# Patient Record
Sex: MALE | Race: WHITE | NOT HISPANIC OR LATINO | Employment: UNEMPLOYED | URBAN - METROPOLITAN AREA
[De-identification: names, ages, dates, MRNs, and addresses within clinical notes are randomized per-mention and may not be internally consistent; named-entity substitution may affect disease eponyms.]

---

## 2019-10-17 ENCOUNTER — TELEPHONE (OUTPATIENT)
Dept: FAMILY MEDICINE | Facility: CLINIC | Age: 59
End: 2019-10-17

## 2019-10-17 ENCOUNTER — OFFICE VISIT (OUTPATIENT)
Dept: FAMILY MEDICINE | Facility: CLINIC | Age: 59
End: 2019-10-17
Payer: COMMERCIAL

## 2019-10-17 VITALS
TEMPERATURE: 99 F | OXYGEN SATURATION: 97 % | HEIGHT: 74 IN | SYSTOLIC BLOOD PRESSURE: 143 MMHG | BODY MASS INDEX: 26.56 KG/M2 | WEIGHT: 207 LBS | HEART RATE: 56 BPM | RESPIRATION RATE: 15 BRPM | DIASTOLIC BLOOD PRESSURE: 90 MMHG

## 2019-10-17 DIAGNOSIS — F33.9 EPISODE OF RECURRENT MAJOR DEPRESSIVE DISORDER, UNSPECIFIED DEPRESSION EPISODE SEVERITY: ICD-10-CM

## 2019-10-17 DIAGNOSIS — F41.9 ANXIETY: ICD-10-CM

## 2019-10-17 DIAGNOSIS — N64.9 BREAST LESION: Primary | ICD-10-CM

## 2019-10-17 DIAGNOSIS — R06.02 SHORTNESS OF BREATH: ICD-10-CM

## 2019-10-17 DIAGNOSIS — F43.10 PTSD (POST-TRAUMATIC STRESS DISORDER): ICD-10-CM

## 2019-10-17 DIAGNOSIS — R13.10 DYSPHAGIA, UNSPECIFIED TYPE: ICD-10-CM

## 2019-10-17 DIAGNOSIS — N64.9 BREAST LESION: ICD-10-CM

## 2019-10-17 DIAGNOSIS — G47.9 SLEEP DISTURBANCE: Primary | ICD-10-CM

## 2019-10-17 PROCEDURE — 99203 OFFICE O/P NEW LOW 30 MIN: CPT | Mod: S$GLB,,, | Performed by: FAMILY MEDICINE

## 2019-10-17 PROCEDURE — 99203 PR OFFICE/OUTPT VISIT, NEW, LEVL III, 30-44 MIN: ICD-10-PCS | Mod: S$GLB,,, | Performed by: FAMILY MEDICINE

## 2019-10-17 RX ORDER — ALBUTEROL SULFATE 90 UG/1
2 AEROSOL, METERED RESPIRATORY (INHALATION) EVERY 6 HOURS PRN
Qty: 18 G | Refills: 11 | Status: SHIPPED | OUTPATIENT
Start: 2019-10-17 | End: 2019-11-18

## 2019-10-17 RX ORDER — TRAZODONE HYDROCHLORIDE 50 MG/1
50-100 TABLET ORAL NIGHTLY
Qty: 60 TABLET | Refills: 11 | Status: SHIPPED | OUTPATIENT
Start: 2019-10-17 | End: 2020-11-11

## 2019-10-17 NOTE — TELEPHONE ENCOUNTER
This is received for the referral you sent.    CPT code 98701 is not on  auth do you still want to use it or change it to one that is on the auth? If changing please change the referral and the order please to reflect the new code.     Thank you   Milton SCANLON   [id number]    Please advise?

## 2019-10-17 NOTE — LETTER
October 18, 2019      [facility]  [address] [facility] [address]           [facility] [address] [place] [address]  Phone: [phone]  Fax: [phone]          Patient: Leonard Rayo   MR Number: [id number]   YOB: 1960   Date of Visit: 10/17/2019       Dear [facility]:    Thank you for referring Leonard Rayo to me for evaluation. Attached you will find relevant portions of my assessment and plan of care.    If you have questions, please do not hesitate to call me. I look forward to following Leonard Rayo along with you.    Sincerely,    Yamilet Kelsey MD    Enclosure  CC:  No Recipients    If you would like to receive this communication electronically, please contact [email or web address] or [phone] to request more information on [employer] Link access.    For providers and/or their staff who would like to refer a patient to [facility], please contact us through our one-stop-shop provider referral line, [facility], at [phone].    If you feel you have received this communication in error or would no longer like to receive these types of communications, please e-mail [email or web address]

## 2019-10-17 NOTE — PROGRESS NOTES
[facility] Note    Subjective      Mr. Rayo is a 59 y.o. male who presents to clinic for establish care.     Is a [place]. [facility] care with us.   Degenerative Disc disease - went to Dr. Goetz and Dr. Anaya who said that  - was told that there was nothing they could do hash does occasionally smoke marijuana which helps relieve his pain.  Does not want any opiates for pain relief.    Shortness of breath  -has had this for several years.  Was previously on for inhalers at 1 time but was taken off of them because of insurance purposes.  Has is asbestos exposure but also history of tobacco use.    Diagnosis with hepatitis-C in 2016, was successfully treated     Dysphagia - Was having trouble swallowing pills.  Is not sure if he has had a scope or not.    2009 - injury - fell - back and knee pain    Sleep  - has tried a lot for sleep in the past, nothing has been really successful for him, he does not want to take any strong medicines for slight        PMH Leonard has a past medical history of Anxiety (2012) and Depression (2012).   PS Leonard has no past surgical history on file.    Leonard's family history is not on file.   SH Leonard reports that he has quit smoking. He has never used smokeless tobacco. He reports that he drinks about 2.0 standard drinks of alcohol per week. He reports that he has current or past drug history.   ALG Leonard has No Known Allergies.   MED Leonard has a current medication list which includes the following prescription(s): albuterol and trazodone.     Review of Systems   Constitutional: Negative.    HENT: Negative.    Eyes: Negative.    Respiratory: Positive for shortness of breath.    Cardiovascular: Positive for chest pain.   Gastrointestinal: Negative for nausea.        Dysphagia   Endocrine: Negative.    Genitourinary: Negative.    Musculoskeletal: Positive for arthralgias and back pain.   Skin: Negative for rash.  "  Psychiatric/Behavioral: Negative for sleep disturbance and suicidal ideas.    Review of systems otherwise negative  Objective     BP (!) 143/90 (BP Location: Right arm, Patient Position: Sitting, BP Method: Large (Automatic))   Pulse (!) 56   Temp 98.7 °F (37.1 °C) (Oral)   Resp 15   Ht 6' 2" (1.88 m)   Wt 93.9 kg (207 lb)   SpO2 97%   BMI 26.58 kg/m²     Physical Exam   Constitutional: He is well-developed, well-nourished, and in no distress. No distress.   HENT:   Head: Normocephalic and atraumatic.   Right Ear: External ear normal.   Left Ear: External ear normal.   Nose: Nose normal.   Mouth/Throat: Oropharynx is clear and moist.   Eyes: Conjunctivae are normal.   Cardiovascular: Normal rate, regular rhythm, normal heart sounds and intact distal pulses.   No murmur heard.  Pulmonary/Chest: He has no wheezes. He has no rales.   Approximately 1 cm nodule in the right breast at 11:00 position.  Mobile, firm.  No skin changes or nipple discharge. Left breast normal.  Lungs:  Moderate air movement, no wheezes or rales, shallow breathing with emphysematous changes noted   Abdominal: Soft. Bowel sounds are normal.   Musculoskeletal: He exhibits no edema or tenderness.   Lymphadenopathy:     He has no cervical adenopathy.   Skin:   Occasional lipomas   Psychiatric:   Tearful throughout the visit, sad, anxious, no SI/HI   Vitals reviewed.    Assessment/Plan     1. Sleep disturbance  traZODone (DESYREL) 50 MG tablet   2. Shortness of breath  albuterol (VENTOLIN HFA) 90 mcg/actuation inhaler   3. Breast lesion  Mammo Digital Diagnostic Bilat w/ Jose Raul    US Breast Right Limited   4. Dysphagia, unspecified type     5. Episode of recurrent major depressive disorder, unspecified depression episode severity     6. Anxiety     7. PTSD (post-traumatic stress disorder)         Will follow up in discuss depression, anxiety, PTSD.  For now start trazodone as patient is hesitant to start an SSRI.  Needs evaluation of " dysphagia.  Recommend trying over-the-counter aids until close follow up.    Follow up in about 1 month (around 11/17/2019).    Future Appointments   Date Time Provider Department Center   11/1/2019  9:20 AM [facility] MAMMO1 [facility] MAMMO [facility]   11/1/2019 11:30 AM [facility] US1 [facility] US [facility]   11/18/2019  9:00 AM Yamilet Kelsey MD [facility]       Yamilet Kelsey MD  Family Medicine  [facility]  [phone]

## 2019-10-18 NOTE — TELEPHONE ENCOUNTER
The Mammogram and US CTP codes need to be changed. The one you have on it now 56407 is not on patient's Insurance but 34988 is. (Breast Mammography) So, can you change on the orders for mammo and us? This was they can send to Insurance. Thank you

## 2019-10-18 NOTE — TELEPHONE ENCOUNTER
Will you ask her if I have to change the diagnosis code that's associated with the order or the actual order? If I have to change the order, I don't see one for just breast mammography.

## 2019-10-31 ENCOUNTER — TELEPHONE (OUTPATIENT)
Dept: FAMILY MEDICINE | Facility: CLINIC | Age: 59
End: 2019-10-31

## 2019-10-31 NOTE — TELEPHONE ENCOUNTER
Inform pt to call Mrs Tucker in scheduling at [phone] for more information on his mammogram. Pt states he will reschedule his appointment with imaging.        ----- Message from Sohan Ferguson sent at 10/31/2019  3:19 PM CDT -----  Contact: Patient  Type: Needs Medical Advice    Who Called:  Patient  Best Call Back Number: [phone]  Additional Information: Patient states mammogram requires the diagnosis code for the authorization. Please call to advise. Thanks!

## 2019-11-04 ENCOUNTER — PATIENT OUTREACH (OUTPATIENT)
Dept: ADMINISTRATIVE | Facility: HOSPITAL | Age: 59
End: 2019-11-04

## 2019-11-04 NOTE — LETTER
"November 4, 2019    Leonard Rayo  [address]  [place] [address]             [facility]  [address]  [facility] [address]  Phone: [phone] Dear Leonard    [facility] is committed to your overall health and would like to ensure that you are up to date on your recommended test and/or procedures.   Yamilet Kelsey MD  has found that your chart shows you may be due for the following:    Health Maintenance Due   Topic Date Due    Hepatitis C Screening  1960    Lipid Panel  1960    TETANUS VACCINE  09/17/1978    Shingles Vaccine (1 of 2) 09/17/2010    Colonoscopy  09/17/2010    Influenza Vaccine (1) 09/01/2019     If you haven't signed up for a colorectal screening please accept this invitation to be screened.      According to the American Cancer Society, colon cancer is the third most common cancer for people in the [place].  A simple screening test "Fit Kit" - done in your own home - can help find colon cancer at an early stage when it can be treated, even before any signs or symptoms develop. THIS IS A YEARLY TEST.    Testing for blood in your stool (feces or bowel movement) is the first step. If you have an upcoming visit with your Primary Care Physician you can  a Fit Kit during your visit or you can  a Fit Kit at your Primary Care Clinic prior to your visit.    The Fit Kit includes:    · Instructions on how to perform the test  · (1) Sheet of tissue paper  · (1) Small Absorption pad  · (1) Bottle to hold the sample and a small probe to help you take the sample  · (1) Small plastic bio-hazard bag  · (1) Postage-paid return envelope    Please do your test (the instructions will tell you how) and then return your sample in the postage-paid return envelope within 24 hours of collection.     If your test results are negative, you won't need testing again for another year.  If results show you need more testing, we will call you with next " "steps.    Regular colorectal cancer screening is one of the most powerful weapons for preventing colon cancer.  The website https://www.cancer.org/cancer/colon-rectal-cancer.html can answer many of your questions about this cancer and its prevention.  Just search for "colorectal cancer".  If you have any questions please call [facility] [phone] for assistance.  If you have had any of the above done at another facility, please let us know so that we may obtain copies from that facility.  If you have a copy of these records, please provide a copy for us to scan into your chart.  You are welcome to request that the report be faxed to us at  ([phone]).     Otherwise, please schedule these appointments at your earliest convenience by calling [phone] or going to [email or web address].    If you have an upcoming scheduled appointment for the item above, please disregard this letter.    Sincerely,  Your [facility]  MD Erika Jean L.P.N. Clinical Care Coordinator  [address] [place] [address], [place] [address] [phone]         "

## 2019-11-06 ENCOUNTER — TELEPHONE (OUTPATIENT)
Dept: FAMILY MEDICINE | Facility: CLINIC | Age: 59
End: 2019-11-06

## 2019-11-06 NOTE — TELEPHONE ENCOUNTER
Followed up with Milton León about patient's mammogram. She spoke with [place] Radiologist and he was reaching out to the patient to explain insurance may no cover procedure. Rad will let Milton León know, but has not heard back. I left a voice message for patient to call me back.

## 2019-11-11 ENCOUNTER — TELEPHONE (OUTPATIENT)
Dept: FAMILY MEDICINE | Facility: CLINIC | Age: 59
End: 2019-11-11

## 2019-11-11 NOTE — TELEPHONE ENCOUNTER
----- Message from Michelle Saab LPN sent at 11/8/2019  7:55 PM CST -----  Contact: Patient      ----- Message -----  From: Sohan Ferguson  Sent: 11/8/2019  11:59 AM CST  To: Low SCANLON Staff    Type:  Patient Returning Call    Who Called: Patient  Who Left Message for Patient:  Shavon Smyth  Does the patient know what this is regarding?:  Unknown - no note/message  Best Call Back Number:  [phone]  Additional Information:  NA

## 2019-11-18 ENCOUNTER — DOCUMENTATION ONLY (OUTPATIENT)
Dept: FAMILY MEDICINE | Facility: CLINIC | Age: 59
End: 2019-11-18

## 2019-11-18 ENCOUNTER — OFFICE VISIT (OUTPATIENT)
Dept: FAMILY MEDICINE | Facility: CLINIC | Age: 59
End: 2019-11-18
Payer: COMMERCIAL

## 2019-11-18 ENCOUNTER — TELEPHONE (OUTPATIENT)
Dept: FAMILY MEDICINE | Facility: CLINIC | Age: 59
End: 2019-11-18

## 2019-11-18 VITALS
DIASTOLIC BLOOD PRESSURE: 92 MMHG | TEMPERATURE: 98 F | BODY MASS INDEX: 26.85 KG/M2 | OXYGEN SATURATION: 95 % | HEIGHT: 74 IN | WEIGHT: 209.19 LBS | HEART RATE: 59 BPM | RESPIRATION RATE: 15 BRPM | SYSTOLIC BLOOD PRESSURE: 150 MMHG

## 2019-11-18 DIAGNOSIS — K04.7 TOOTH INFECTION: ICD-10-CM

## 2019-11-18 DIAGNOSIS — R09.81 NASAL CONGESTION: ICD-10-CM

## 2019-11-18 DIAGNOSIS — R06.02 SHORTNESS OF BREATH: ICD-10-CM

## 2019-11-18 DIAGNOSIS — R13.10 DYSPHAGIA, UNSPECIFIED TYPE: ICD-10-CM

## 2019-11-18 DIAGNOSIS — Z11.59 NEED FOR HEPATITIS C SCREENING TEST: Primary | ICD-10-CM

## 2019-11-18 DIAGNOSIS — R68.89 FLU-LIKE SYMPTOMS: ICD-10-CM

## 2019-11-18 DIAGNOSIS — Z13.220 LIPID SCREENING: ICD-10-CM

## 2019-11-18 DIAGNOSIS — F41.9 ANXIETY: ICD-10-CM

## 2019-11-18 DIAGNOSIS — Z12.11 COLON CANCER SCREENING: ICD-10-CM

## 2019-11-18 DIAGNOSIS — G47.9 SLEEP DISTURBANCE: ICD-10-CM

## 2019-11-18 LAB
INFLUENZA A, MOLECULAR: NEGATIVE
INFLUENZA B, MOLECULAR: NEGATIVE
SPECIMEN SOURCE: NORMAL

## 2019-11-18 PROCEDURE — 99214 PR OFFICE/OUTPT VISIT, EST, LEVL IV, 30-39 MIN: ICD-10-PCS | Mod: S$GLB,,, | Performed by: FAMILY MEDICINE

## 2019-11-18 PROCEDURE — 87502 INFLUENZA DNA AMP PROBE: CPT

## 2019-11-18 PROCEDURE — 99214 OFFICE O/P EST MOD 30 MIN: CPT | Mod: S$GLB,,, | Performed by: FAMILY MEDICINE

## 2019-11-18 RX ORDER — DULOXETIN HYDROCHLORIDE 30 MG/1
30 CAPSULE, DELAYED RELEASE ORAL DAILY
Qty: 30 CAPSULE | Refills: 11 | Status: SHIPPED | OUTPATIENT
Start: 2019-11-18 | End: 2019-12-18 | Stop reason: SDUPTHER

## 2019-11-18 RX ORDER — DULOXETIN HYDROCHLORIDE 30 MG/1
30 CAPSULE, DELAYED RELEASE ORAL DAILY
Qty: 30 CAPSULE | Refills: 11 | Status: CANCELLED | OUTPATIENT
Start: 2019-11-18 | End: 2020-11-17

## 2019-11-18 RX ORDER — PRAZOSIN HYDROCHLORIDE 1 MG/1
1 CAPSULE ORAL NIGHTLY
Qty: 30 CAPSULE | Refills: 11 | Status: CANCELLED | OUTPATIENT
Start: 2019-11-18 | End: 2020-11-17

## 2019-11-18 RX ORDER — CETIRIZINE HYDROCHLORIDE, PSEUDOEPHEDRINE HYDROCHLORIDE 5; 120 MG/1; MG/1
1 TABLET, FILM COATED, EXTENDED RELEASE ORAL 2 TIMES DAILY PRN
Qty: 20 TABLET | Refills: 0 | Status: CANCELLED | OUTPATIENT
Start: 2019-11-18 | End: 2019-11-28

## 2019-11-18 RX ORDER — CLINDAMYCIN HYDROCHLORIDE 300 MG/1
300 CAPSULE ORAL EVERY 8 HOURS
Qty: 21 CAPSULE | Refills: 0 | Status: SHIPPED | OUTPATIENT
Start: 2019-11-18 | End: 2020-11-11 | Stop reason: ALTCHOICE

## 2019-11-18 RX ORDER — PRAZOSIN HYDROCHLORIDE 1 MG/1
1 CAPSULE ORAL NIGHTLY
Qty: 30 CAPSULE | Refills: 11 | Status: SHIPPED | OUTPATIENT
Start: 2019-11-18 | End: 2020-11-11

## 2019-11-18 RX ORDER — ALBUTEROL SULFATE 90 UG/1
2 AEROSOL, METERED RESPIRATORY (INHALATION) EVERY 6 HOURS PRN
Qty: 18 G | Refills: 11 | Status: SHIPPED | OUTPATIENT
Start: 2019-11-18 | End: 2019-12-18 | Stop reason: SDUPTHER

## 2019-11-18 RX ORDER — CETIRIZINE HYDROCHLORIDE, PSEUDOEPHEDRINE HYDROCHLORIDE 5; 120 MG/1; MG/1
1 TABLET, FILM COATED, EXTENDED RELEASE ORAL 2 TIMES DAILY PRN
Qty: 20 TABLET | Refills: 0 | Status: SHIPPED | OUTPATIENT
Start: 2019-11-18 | End: 2019-11-21 | Stop reason: CLARIF

## 2019-11-18 NOTE — TELEPHONE ENCOUNTER
----- Message from Andreea Rivera sent at 11/18/2019  2:05 PM CST -----  Type: Needs Medical Advice    Who Called:  Scott Valencia Call Back Number: [phone]  Additional Information: patient was there this morning and patient is [facility] [place]   Can be faxed to [phone]

## 2019-11-18 NOTE — LETTER
November 18, 2019      [facility]  [address] [place] [address]           [facility] [address] [place] [address]  Phone: [phone]  Fax: [phone]          Patient: Leonard Rayo   MR Number: [id number]   YOB: 1960   Date of Visit: 11/18/2019       Dear [facility]:    Thank you for referring Leonard Rayo to me for evaluation. Attached you will find relevant portions of my assessment and plan of care.    If you have questions, please do not hesitate to call me. I look forward to following Leonard Rayo along with you.    Sincerely,    Yamilet Kelsey MD    Enclosure  CC:  No Recipients    If you would like to receive this communication electronically, please contact [email or web address] or [phone] to request more information on [employer] Link access.    For providers and/or their staff who would like to refer a patient to [facility], please contact us through our one-stop-shop provider referral line, [facility], at [phone].    If you feel you have received this communication in error or would no longer like to receive these types of communications, please e-mail [email or web address]

## 2019-11-18 NOTE — PROGRESS NOTES
"  [facility] - Clinic Note    Subjective      Mr. Rayo is a 59 y.o. male who presents to clinic for follow up. Also with illness.     Nasal congestion  Threw up white phelgm. Has felt congested, achy, runny nose. May have had a fever for a night, night sweats. Symptoms started about one month ago. Shortness of breath worsening with cold symptoms.    Tooth infection  Sore, painful tooth and surrounding gums. Has not been to a dentist in a long time.     Breast lump. US and mammo ordered. No changes currently.     Sleep disturbance  Has tried trazodone for sleep. Has tried up to 100 mg. Wakes up with pain and restlessness.    Anxiety  Has lived with this daily for the last several years. Has not tried anything for it. Does smoke marijuana because of pain. Is hesitant to start SSRIs or mood altering medicines.     [facility] Leonard has a past medical history of Anxiety (2012) and Depression (2012).   PSX Leonard has no past surgical history on file.    Leonard's family history is not on file.    Leonard reports that he has quit smoking. He has never used smokeless tobacco. He reports that he drinks about 2.0 standard drinks of alcohol per week. He reports that he has current or past drug history.   [facility] Leonard has No Known Allergies.   MED Leonard has a current medication list which includes the following prescription(s): trazodone, albuterol, cetirizine-pseudoephedrine, clindamycin, duloxetine, and prazosin.     Review of Systems   HENT: Positive for congestion, postnasal drip and rhinorrhea. Negative for sore throat.    Respiratory: Positive for shortness of breath (increased).    Musculoskeletal: Positive for back pain and myalgias.   Neurological: Negative for headaches.     Objective     BP (!) 150/92 (BP Location: Right arm, Patient Position: Sitting, BP Method: Medium (Automatic))   Pulse (!) 59   Temp 97.9 °F (36.6 °C) (Oral)   Resp 15   Ht 6' 2" (1.88 m)   Wt 94.9 kg (209 lb 3.2 oz)   SpO2 95%   BMI 26.86 kg/m² "     Physical Exam   HENT:   Head: Normocephalic and atraumatic.   Nose: Mucosal edema and rhinorrhea present.   Mouth/Throat: Posterior oropharyngeal erythema present.   Fluid behind both ears. Post nasal drip present.    Eyes: Conjunctivae are normal.   Cardiovascular: Normal rate, regular rhythm, normal heart sounds and intact distal pulses.   No murmur heard.  Pulmonary/Chest: Effort normal and breath sounds normal. He has no wheezes. He has no rales.   Skin: Skin is warm and dry.   Psychiatric: Judgment normal.   Anxious mood and affect. No SI/HI     Assessment/Plan     1. Need for hepatitis C screening test     2. Lipid screening  Lipid panel   3. Colon cancer screening  Fecal Immunochemical Test (iFOBT)   4. Dysphagia, unspecified type     5. Anxiety  DULoxetine (CYMBALTA) 30 MG capsule   6. Sleep disturbance  prazosin (MINIPRESS) 1 MG Cap   7. Flu-like symptoms  Influenza A & B by Molecular   8. Tooth infection  clindamycin (CLEOCIN) 300 MG capsule   9. Nasal congestion  cetirizine-pseudoephedrine 5-120 mg Tb12   10. Shortness of breath  albuterol (VENTOLIN HFA) 90 mcg/actuation inhaler     Start Zyrtec D for congestion.  F/u flu test.  Needs scheduling for breast lump.  Increase inhaler use to scheduled while experiencing cold symptoms  Clindamycin for tooth abscess/infection  Start prazosin for sleep disturbance. May continue to use trazodone.   Start cymbalta for anxiety/pain        Future Appointments   Date Time Provider Department Center   12/4/2019  8:45 AM [facility] US1 [facility] US [facility]   12/4/2019 10:00 AM [facility] MAMMO1 [facility] MAMMO [facility]       Yamilet Kelsey MD  Family Medicine  [facility]  [phone]

## 2019-11-19 NOTE — TELEPHONE ENCOUNTER
----- Message from Edie Marshall sent at 11/19/2019  2:41 PM CST -----  Contact: self  Type: Needs Medical Advice    Who Called:  self  Symptoms (please be specific):    How long has patient had these symptoms:    Pharmacy name and phone #:  [facility] [phone]    Best Call Back Number: [phone] (home)   Additional Information: Patient states [facility] only received [facility] al[facility]erol prescriptions. Patient would like the other prescriptions called to the [facility]. Please call patient to verify prescriptions are going to the right phamracy. Thanks!

## 2019-11-20 ENCOUNTER — TELEPHONE (OUTPATIENT)
Dept: FAMILY MEDICINE | Facility: CLINIC | Age: 59
End: 2019-11-20

## 2019-11-20 NOTE — TELEPHONE ENCOUNTER
[facility] called--cetirizine-pseudoephedrine 5-120 mg Tb12 is not covered. But pse/triprolidine is. Will need prescription faxed to [phone].  Please advise Thank you,

## 2019-11-20 NOTE — TELEPHONE ENCOUNTER
----- Message from Jil Ruiz sent at 11/20/2019  9:18 AM CST -----  Contact[facility]  Type:  Pharmacy Calling to Clarify an RX    Name of Caller:  [place]  [facility]arm[facility]  [facility]  Prescription Name:  cetirizine-pseudoephedrine 5-120 mg Tb12  What do they need to clarify?:  The drug is not covered  Best Call Back Number:  [phone]  Additional Information:  Alternatives are pse/triprolidine. Need prescription has to be faxed to [phone]

## 2019-11-21 ENCOUNTER — TELEPHONE (OUTPATIENT)
Dept: FAMILY MEDICINE | Facility: CLINIC | Age: 59
End: 2019-11-21

## 2019-11-21 NOTE — TELEPHONE ENCOUNTER
Attempt to call pt. No answer, left message [facility] will have medication in on tomorrow. If any question please call back.             ----- Message from Elizabeth Irene sent at 11/21/2019 11:38 AM CST -----  Contact: [facility]  Type:  Pharmacy Calling to Clarify an RX    Name of Caller:  [facility] pharmacist or [facility] Name:  Prescription Name:  Zyrtec   What do they need to clarify?:  [facility]  Best Call Back Number:  [phone]   Additional Information:  rx is not formulary. The pharmacist asking if the rx should be split.

## 2019-12-03 ENCOUNTER — TELEPHONE (OUTPATIENT)
Dept: FAMILY MEDICINE | Facility: CLINIC | Age: 59
End: 2019-12-03

## 2019-12-03 ENCOUNTER — PATIENT OUTREACH (OUTPATIENT)
Dept: ADMINISTRATIVE | Facility: HOSPITAL | Age: 59
End: 2019-12-03

## 2019-12-03 DIAGNOSIS — N64.4 BREAST PAIN, RIGHT: ICD-10-CM

## 2019-12-03 DIAGNOSIS — N63.10 LUMP OF RIGHT BREAST: Primary | ICD-10-CM

## 2019-12-03 NOTE — TELEPHONE ENCOUNTER
Spoke with Pre-Service Rep, Milton León, the CPT code and orders have been approved. I LVM for patient stating his tests are approved and scheduled for 8:00am 12/04/2019.

## 2019-12-03 NOTE — LETTER
December 3, 2019    Leonard Rayo  [address]  [place] [address]             [facility]  [address] [facility] [address]  Phone: [phone] Dear Paul [facility] is committed to your overall health and would like to ensure that you are up to date on your recommended test and/or procedures.   Yamilet Kelsey MD  has found that your chart shows you may be due for the following:    Health Maintenance Due   Topic Date Due    Hepatitis C Screening  1960    Lipid Panel  1960    TETANUS VACCINE  09/17/1978    Shingles Vaccine (1 of 2) 09/17/2010    Colonoscopy  09/17/2010    Influenza Vaccine (1) 09/01/2019     If you have had any of the above done at another facility, please let us know so that we may obtain copies from that facility.  If you have a copy of these records, please provide a copy for us to scan into your chart.  You are welcome to request that the report be faxed to us at  ([phone]).     Otherwise, please schedule these appointments at your earliest convenience by calling [phone] or going to [email or web address].    If you have an upcoming scheduled appointment for the item above, please disregard this letter.    Sincerely,  [facility]  MD Erika Jean L.P.N. Clinical Care Coordinator  [address] [place] [address], [place] [id number] [phone]

## 2019-12-04 ENCOUNTER — HOSPITAL ENCOUNTER (OUTPATIENT)
Dept: RADIOLOGY | Facility: HOSPITAL | Age: 59
Discharge: HOME | End: 2019-12-04
Attending: FAMILY MEDICINE
Payer: COMMERCIAL

## 2019-12-04 VITALS — WEIGHT: 209.19 LBS | BODY MASS INDEX: 26.85 KG/M2 | HEIGHT: 74 IN

## 2019-12-04 DIAGNOSIS — N64.4 BREAST PAIN, RIGHT: ICD-10-CM

## 2019-12-04 DIAGNOSIS — N63.10 LUMP OF RIGHT BREAST: ICD-10-CM

## 2019-12-04 PROCEDURE — 77062 BREAST TOMOSYNTHESIS BI: CPT | Mod: 26,,, | Performed by: RADIOLOGY

## 2019-12-04 PROCEDURE — 77062 MAMMO DIGITAL DIAGNOSTIC BILAT WITH TOMOSYNTHESIS_CAD: ICD-10-PCS | Mod: 26,,, | Performed by: RADIOLOGY

## 2019-12-04 PROCEDURE — 76642 ULTRASOUND BREAST LIMITED: CPT | Mod: 26,RT,, | Performed by: RADIOLOGY

## 2019-12-04 PROCEDURE — 76642 ULTRASOUND BREAST LIMITED: CPT | Mod: TC,RT

## 2019-12-04 PROCEDURE — 76642 US BREAST RIGHT LIMITED: ICD-10-PCS | Mod: 26,RT,, | Performed by: RADIOLOGY

## 2019-12-04 PROCEDURE — 77066 MAMMO DIGITAL DIAGNOSTIC BILAT WITH TOMOSYNTHESIS_CAD: ICD-10-PCS | Mod: 26,,, | Performed by: RADIOLOGY

## 2019-12-04 PROCEDURE — 77066 DX MAMMO INCL CAD BI: CPT | Mod: 26,,, | Performed by: RADIOLOGY

## 2019-12-04 PROCEDURE — 77062 BREAST TOMOSYNTHESIS BI: CPT | Mod: TC

## 2019-12-07 ENCOUNTER — LAB VISIT (OUTPATIENT)
Dept: LAB | Facility: HOSPITAL | Age: 59
End: 2019-12-07
Attending: FAMILY MEDICINE
Payer: COMMERCIAL

## 2019-12-07 DIAGNOSIS — Z12.11 COLON CANCER SCREENING: ICD-10-CM

## 2019-12-12 ENCOUNTER — TELEPHONE (OUTPATIENT)
Dept: FAMILY MEDICINE | Facility: CLINIC | Age: 59
End: 2019-12-12

## 2019-12-12 ENCOUNTER — TELEPHONE (OUTPATIENT)
Dept: ADMINISTRATIVE | Facility: HOSPITAL | Age: 59
End: 2019-12-12

## 2019-12-12 ENCOUNTER — PATIENT OUTREACH (OUTPATIENT)
Dept: ADMINISTRATIVE | Facility: HOSPITAL | Age: 59
End: 2019-12-12

## 2019-12-12 RX ORDER — ROSUVASTATIN CALCIUM 5 MG/1
5 TABLET, COATED ORAL DAILY
Qty: 90 TABLET | Refills: 3 | Status: SHIPPED | OUTPATIENT
Start: 2019-12-12 | End: 2020-11-11

## 2019-12-12 NOTE — TELEPHONE ENCOUNTER
Noted. Awaiting results.     ----- Message from Alondra Bowser sent at 12/12/2019  3:40 PM CST -----  Contact: pt  ..Type: Needs Medical Advice    Who Called: Pt  Best Call Back Number: .[phone] (home)   Additional Information: Pt requesting to speak with a nurse to inform that he sent in his colonoscopy screening sample.  Please call pt to advise  Thanks

## 2019-12-13 LAB — HEMOCCULT STL QL IA: NEGATIVE

## 2019-12-13 PROCEDURE — 82274 ASSAY TEST FOR BLOOD FECAL: CPT

## 2019-12-18 ENCOUNTER — OFFICE VISIT (OUTPATIENT)
Dept: FAMILY MEDICINE | Facility: CLINIC | Age: 59
End: 2019-12-18
Payer: COMMERCIAL

## 2019-12-18 VITALS
SYSTOLIC BLOOD PRESSURE: 143 MMHG | BODY MASS INDEX: 26.46 KG/M2 | DIASTOLIC BLOOD PRESSURE: 93 MMHG | OXYGEN SATURATION: 97 % | HEIGHT: 74 IN | TEMPERATURE: 98 F | HEART RATE: 60 BPM | WEIGHT: 206.19 LBS | RESPIRATION RATE: 16 BRPM

## 2019-12-18 DIAGNOSIS — G47.9 SLEEP DISTURBANCE: ICD-10-CM

## 2019-12-18 DIAGNOSIS — F41.9 ANXIETY: ICD-10-CM

## 2019-12-18 DIAGNOSIS — I10 ESSENTIAL HYPERTENSION: Primary | ICD-10-CM

## 2019-12-18 DIAGNOSIS — R06.02 SHORTNESS OF BREATH: ICD-10-CM

## 2019-12-18 PROCEDURE — 99214 OFFICE O/P EST MOD 30 MIN: CPT | Mod: S$GLB,,, | Performed by: FAMILY MEDICINE

## 2019-12-18 PROCEDURE — 99214 PR OFFICE/OUTPT VISIT, EST, LEVL IV, 30-39 MIN: ICD-10-PCS | Mod: S$GLB,,, | Performed by: FAMILY MEDICINE

## 2019-12-18 RX ORDER — ALBUTEROL SULFATE 90 UG/1
2 AEROSOL, METERED RESPIRATORY (INHALATION) EVERY 6 HOURS PRN
Qty: 18 G | Refills: 11 | Status: SHIPPED | OUTPATIENT
Start: 2019-12-18 | End: 2020-11-11 | Stop reason: SDUPTHER

## 2019-12-18 RX ORDER — TEMAZEPAM 15 MG/1
15 CAPSULE ORAL NIGHTLY PRN
Qty: 45 CAPSULE | Refills: 0 | Status: SHIPPED | OUTPATIENT
Start: 2019-12-18 | End: 2020-02-01

## 2019-12-18 RX ORDER — DULOXETIN HYDROCHLORIDE 60 MG/1
60 CAPSULE, DELAYED RELEASE ORAL DAILY
Qty: 30 CAPSULE | Refills: 11 | Status: SHIPPED | OUTPATIENT
Start: 2019-12-18 | End: 2020-11-11

## 2019-12-18 RX ORDER — LISINOPRIL 10 MG/1
10 TABLET ORAL DAILY
Qty: 90 TABLET | Refills: 3 | Status: SHIPPED | OUTPATIENT
Start: 2019-12-18 | End: 2020-11-11

## 2019-12-18 NOTE — PROGRESS NOTES
"  [facility] - Clinic Note    Subjective      Mr. Rayo is a 59 y.o. male who presents to clinic for follow up.    Patient has past medical history of anxiety and depression.  He is not currently on any medication for this.  He has sleep disturbances in stays up at hot hours throughout the night.  Has difficulty falling asleep.  Has PTSD possibly.  Has chronic pain.  Feels drained with no energy.  Today he also has elevated blood pressure.  Has not been on medicine for this in the past.  Has tried trazodone in the past for sleep disturbance which has not really helped.  He feels exhausted.        PM Leonard has a past medical history of Anxiety (2012) and Depression (2012).   PSXH Leonard has no past surgical history on file.    Leonard's family history is not on file.    Leonard reports that he has quit smoking. He has never used smokeless tobacco. He reports that he drinks about 2.0 standard drinks of alcohol per week. He reports that he has current or past drug history.   ALG Leonard has No Known Allergies.   MED Leonard has a current medication list which includes the following prescription(s): albuterol, clindamycin, duloxetine, prazosin, rosuvastatin, trazodone, lisinopril, and temazepam.     Review of Systems   Constitutional: Positive for fatigue.   Respiratory: Positive for shortness of breath.    Cardiovascular: Negative for chest pain.   Psychiatric/Behavioral: Positive for dysphoric mood and sleep disturbance. Negative for self-injury and suicidal ideas.    Review of systems otherwise negative  Objective     BP (!) 143/93 (BP Location: Right arm, Patient Position: Sitting, BP Method: Medium (Automatic))   Pulse 60   Temp 97.8 °F (36.6 °C) (Oral)   Resp 16   Ht 6' 2" (1.88 m)   Wt 93.5 kg (206 lb 3.2 oz)   SpO2 97%   BMI 26.47 kg/m²     Physical Exam   Constitutional: He is oriented to person, place, and time.   Appears tired, anxious, no acute distress, no diaphoresis   HENT:   Head: Normocephalic and " atraumatic.   Right Ear: External ear normal.   Left Ear: External ear normal.   Nose: Nose normal.   Mouth/Throat: Oropharynx is clear and moist.   Eyes: Conjunctivae are normal.   Cardiovascular: Normal rate, regular rhythm, normal heart sounds and intact distal pulses.   No murmur heard.  Pulmonary/Chest: Effort normal and breath sounds normal. He has no wheezes. He has no rales.   Musculoskeletal: He exhibits no deformity.   Lymphadenopathy:     He has no cervical adenopathy.   Neurological: He is alert and oriented to person, place, and time.   Skin: Skin is warm and dry.   Psychiatric:   Anxious, PTSD, no SI or HI.  Having sleep disturbances and feeling of cloudiness   Vitals reviewed.    Assessment/Plan     1. Essential hypertension  lisinopril 10 MG tablet   2. Anxiety  DULoxetine (CYMBALTA) 60 MG capsule   3. Shortness of breath  albuterol (VENTOLIN HFA) 90 mcg/actuation inhaler   4. Sleep disturbance  temazepam (RESTORIL) 15 mg Cap     Start lisinopril for hypertension.  Start Cymbalta for anxiety, depression and chronic pain. Start albuterol for possible COPD.  Start Restoril for sleep disturbance.  This should also help with anxiety.    Prescriptions [facility] to [facility].      Future Appointments   Date Time Provider Department Center   1/20/2020  8:00 AM Yamilet Kelsey MD [facility]       Yamilet Kelsey MD  Family Medicine  [facility]  [phone]

## 2019-12-18 NOTE — LETTER
January 3, 2020      [facility]  [address]  [place] [address]           [facility] [address] [place] [address]  Phone: [phone]  Fax: [phone]          Patient: Leonard Rayo   MR Number: [id number]   YOB: 1960   Date of Visit: 12/18/2019       Dear [facility]:    Thank you for referring Leonard Rayo to me for evaluation. Attached you will find relevant portions of my assessment and plan of care.    If you have questions, please do not hesitate to call me. I look forward to following Leonard Rayo along with you.    Sincerely,    Yamilet Kelsey MD    Enclosure  CC:  No Recipients    If you would like to receive this communication electronically, please contact [email or web address] or [phone] to request more information on [facility] access.    For providers and/or their staff who would like to refer a patient to [facility], please contact us through our one-stop-shop provider referral line, [facility], at [phone].    If you feel you have received this communication in error or would no longer like to receive these types of communications, please e-mail [email or web address]

## 2019-12-19 DIAGNOSIS — Z11.59 NEED FOR HEPATITIS C SCREENING TEST: ICD-10-CM

## 2020-01-03 ENCOUNTER — PATIENT OUTREACH (OUTPATIENT)
Dept: ADMINISTRATIVE | Facility: HOSPITAL | Age: 60
End: 2020-01-03

## 2020-02-05 ENCOUNTER — PATIENT OUTREACH (OUTPATIENT)
Dept: ADMINISTRATIVE | Facility: HOSPITAL | Age: 60
End: 2020-02-05

## 2020-06-18 DIAGNOSIS — I10 ESSENTIAL HYPERTENSION: ICD-10-CM

## 2020-10-15 ENCOUNTER — PATIENT OUTREACH (OUTPATIENT)
Dept: FAMILY MEDICINE | Facility: CLINIC | Age: 60
End: 2020-10-15

## 2020-10-15 NOTE — LETTER
"October 15, 2020    Leonard Rayo  [address]  [place] [address]             [facility]  [address] [facility] [place] [address]  Phone: [phone]  Fax: [phone] Dear Leonard Rayo,      We were reviewing your last few blood pressure values in your chart  from your clinic visits and noticed that they were trending upward. We would like for you to come in to have your blood pressure rechecked. This visit would be no charge to you. Please call us at [phone] and ask to schedule a "Nurse Visit". If you are unable to come in and if you are able to read your own blood pressure, we can take your reading over the phone or you can send it through your MY CHART message.Thank You for choosing [facility] for your healthcare needs.      Your [facility]  Erika Cano L.P.N. Clinical Care Coordinator  [address] [facility] [place] [address]  [phone]       "

## 2020-10-21 ENCOUNTER — NURSE TRIAGE (OUTPATIENT)
Dept: ADMINISTRATIVE | Facility: CLINIC | Age: 60
End: 2020-10-21

## 2020-10-21 NOTE — TELEPHONE ENCOUNTER
Pt with SOB worse than normal. He has been having to use his inhaler more than usual and he needs to get in with MD and get more inhalers. Pt advised  Go to ER now. Refused dispo. Pt just wants to get an appt with his MD. RN continued to advise dispo of ER. RN will send message to Dr Kelsey per pt's request. Advised pt to call back with questions or concerns or go to ER with worsening of symptoms as already advised.    Reason for Disposition   MODERATE difficulty breathing (e.g., speaks in phrases, SOB even at rest, pulse 100-120) of new onset or worse than normal    Additional Information   Negative: Breathing stopped and hasn't returned   Negative: Choking on something   Negative: SEVERE difficulty breathing (e.g., struggling for each breath, speaks in single words, pulse > 120)   Negative: Bluish (or gray) lips or face   Negative: Difficult to awaken or acting confused (e.g., disoriented, slurred speech)   Negative: Passed out (i.e., fainted, collapsed and was not responding)   Negative: Wheezing started suddenly after medicine, an allergic food, or bee sting   Negative: Stridor   Negative: Slow, shallow and weak breathing   Negative: Sounds like a life-threatening emergency to the triager    Protocols used: BREATHING DIFFICULTY-A-OH

## 2020-11-11 ENCOUNTER — OFFICE VISIT (OUTPATIENT)
Dept: FAMILY MEDICINE | Facility: CLINIC | Age: 60
End: 2020-11-11
Payer: OTHER GOVERNMENT

## 2020-11-11 VITALS
HEART RATE: 62 BPM | WEIGHT: 212 LBS | DIASTOLIC BLOOD PRESSURE: 100 MMHG | BODY MASS INDEX: 27.21 KG/M2 | TEMPERATURE: 98 F | RESPIRATION RATE: 19 BRPM | HEIGHT: 74 IN | OXYGEN SATURATION: 97 % | SYSTOLIC BLOOD PRESSURE: 136 MMHG

## 2020-11-11 DIAGNOSIS — I10 ESSENTIAL HYPERTENSION: Primary | ICD-10-CM

## 2020-11-11 DIAGNOSIS — R06.02 SHORTNESS OF BREATH: ICD-10-CM

## 2020-11-11 PROCEDURE — 99214 PR OFFICE/OUTPT VISIT, EST, LEVL IV, 30-39 MIN: ICD-10-PCS | Mod: S$GLB,,, | Performed by: FAMILY MEDICINE

## 2020-11-11 PROCEDURE — 99214 OFFICE O/P EST MOD 30 MIN: CPT | Mod: S$GLB,,, | Performed by: FAMILY MEDICINE

## 2020-11-11 RX ORDER — ALBUTEROL SULFATE 90 UG/1
2 AEROSOL, METERED RESPIRATORY (INHALATION) EVERY 6 HOURS PRN
Qty: 18 G | Refills: 11 | Status: SHIPPED | OUTPATIENT
Start: 2020-11-11 | End: 2021-11-11

## 2020-11-11 NOTE — PROGRESS NOTES
"[facility] - Clinic Note    Subjective      Mr. Rayo is a 60 y.o. male who presents to clinic to transition care.     HTN: prescribed lisinopril   Has not been taking it.   BP elevated today.     Needs of albuterol inhaler. Uses prn for shortness of breath.     PMH Leonard has a past medical history of Anxiety (2012) and Depression (2012).   PSXH Leonard has no past surgical history on file.    Leonard's family history is not on file.    Leonard reports that he has quit smoking. He has never used smokeless tobacco. He reports current alcohol use of about 2.0 standard drinks of alcohol per week. He reports previous drug use.   ALG Leonard has No Known Allergies.   MED Leonard has a current medication list which includes the following prescription(s): ventolin hfa.     Review of Systems   Constitutional: Negative for activity change, appetite change, chills, fatigue and fever.   Eyes: Negative for visual disturbance.   Respiratory: Negative for cough and shortness of breath.    Cardiovascular: Negative for chest pain, palpitations and leg swelling.   Gastrointestinal: Negative for abdominal pain, nausea and vomiting.   Skin: Negative for wound.   Neurological: Negative for dizziness, syncope and headaches.   Psychiatric/Behavioral: Negative for confusion.     Objective     BP (!) 136/100 (BP Location: Left arm, Patient Position: Sitting, BP Method: Medium (Manual))   Pulse 62   Temp 98.1 °F (36.7 °C) (Temporal)   Resp 19   Ht 6' 2" (1.88 m)   Wt 96.2 kg (212 lb)   SpO2 97%   BMI 27.22 kg/m²     Physical Exam   Constitutional:  Non-toxic appearance. He does not appear ill. No distress.   HENT:   Head: Normocephalic and atraumatic.   Eyes: Right eye exhibits no discharge. Left eye exhibits no discharge.   Cardiovascular: Normal rate, regular rhythm, normal heart sounds and normal pulses. Exam reveals no gallop and no friction rub.   No murmur heard.  Pulmonary/Chest: Effort normal and breath sounds normal. No " respiratory distress. He has no wheezes. He has no rhonchi. He has no rales.   Abdominal: Normal appearance.   Musculoskeletal:      Right lower leg: No edema.      Left lower leg: No edema.   Lymphadenopathy:     He has no cervical adenopathy.   Neurological: He is alert.   Skin: Skin is warm and dry. Capillary refill takes less than 2 seconds. He is not diaphoretic.   Psychiatric: His behavior is normal. Mood, judgment and thought content normal.   Vitals reviewed.     Assessment/Plan     Leonard was seen today for transitional care.    Diagnoses and all orders for this visit:  -New patient and new problem to me    Essential hypertension  -elevated today. Not taking medication. Start lisinopril. Counseled to check BP at home a couple times a week and bring log into next visit. Counseled patient a low sodium diet and moderate intensity exercise 30 mins daily.    Shortness of breath  -     VENTOLIN HFA 90 mcg/actuation inhaler; Inhale 2 puffs into the lungs every 6 (six) hours as needed for Wheezing or Shortness of Breath. Rescue        Future Appointments   Date Time Provider Department Center   12/11/2020  9:00 AM Shana Johnson MD [facility]       Shana Johnson MD  Family Medicine  [facility]

## 2020-11-12 ENCOUNTER — TELEPHONE (OUTPATIENT)
Dept: FAMILY MEDICINE | Facility: CLINIC | Age: 60
End: 2020-11-12

## 2020-11-12 NOTE — TELEPHONE ENCOUNTER
"attempted to contact patient, phone rang once, " the number you are trying to reach is not in service."   "

## 2020-11-12 NOTE — TELEPHONE ENCOUNTER
----- Message from Piedad Sheldon MA sent at 11/12/2020  9:26 AM CST -----  Type: Needs Medical Advice  Who Called:  Zuly MENENDEZ Pharmacist  Best Call Back Number: [phone]  Additional Information: needs to speak with someone regarding Ventolin inhaler

## 2020-12-23 ENCOUNTER — TELEPHONE (OUTPATIENT)
Dept: FAMILY MEDICINE | Facility: CLINIC | Age: 60
End: 2020-12-23

## 2020-12-23 DIAGNOSIS — Z12.11 COLON CANCER SCREENING: ICD-10-CM

## 2020-12-23 NOTE — TELEPHONE ENCOUNTER
Calling patient about elevated b/p at last clinic visit.  Pt stated he is inable to doa home bp. Pt said he had a cold rt now and did not want to come into clinic till it was over. He also wanted to make an appt to see PCP for back pain. [place]. Pcp visit for 01/07/21 at [facility]. Confirmed with pt at this time.

## 2021-02-04 ENCOUNTER — PATIENT OUTREACH (OUTPATIENT)
Dept: ADMINISTRATIVE | Facility: HOSPITAL | Age: 61
End: 2021-02-04

## 2021-02-09 ENCOUNTER — OFFICE VISIT (OUTPATIENT)
Dept: FAMILY MEDICINE | Facility: CLINIC | Age: 61
End: 2021-02-09
Payer: OTHER GOVERNMENT

## 2021-02-09 VITALS
DIASTOLIC BLOOD PRESSURE: 82 MMHG | HEART RATE: 59 BPM | SYSTOLIC BLOOD PRESSURE: 142 MMHG | RESPIRATION RATE: 17 BRPM | BODY MASS INDEX: 26.56 KG/M2 | OXYGEN SATURATION: 96 % | HEIGHT: 74 IN | WEIGHT: 207 LBS | TEMPERATURE: 98 F

## 2021-02-09 DIAGNOSIS — M54.9 CHRONIC UPPER BACK PAIN: ICD-10-CM

## 2021-02-09 DIAGNOSIS — I10 ESSENTIAL HYPERTENSION: Primary | ICD-10-CM

## 2021-02-09 DIAGNOSIS — R06.02 SHORTNESS OF BREATH: ICD-10-CM

## 2021-02-09 DIAGNOSIS — Z12.5 SCREENING FOR PROSTATE CANCER: ICD-10-CM

## 2021-02-09 DIAGNOSIS — R25.2 MUSCLE CRAMPS: ICD-10-CM

## 2021-02-09 DIAGNOSIS — R39.12 BENIGN PROSTATIC HYPERPLASIA WITH WEAK URINARY STREAM: ICD-10-CM

## 2021-02-09 DIAGNOSIS — N40.1 BENIGN PROSTATIC HYPERPLASIA WITH WEAK URINARY STREAM: ICD-10-CM

## 2021-02-09 DIAGNOSIS — G89.29 CHRONIC UPPER BACK PAIN: ICD-10-CM

## 2021-02-09 PROCEDURE — 99214 OFFICE O/P EST MOD 30 MIN: CPT | Mod: S$GLB,,, | Performed by: FAMILY MEDICINE

## 2021-02-09 PROCEDURE — 99214 PR OFFICE/OUTPT VISIT, EST, LEVL IV, 30-39 MIN: ICD-10-PCS | Mod: S$GLB,,, | Performed by: FAMILY MEDICINE

## 2021-02-09 RX ORDER — LISINOPRIL 10 MG/1
10 TABLET ORAL DAILY
COMMUNITY
End: 2021-02-09 | Stop reason: SDUPTHER

## 2021-03-04 DIAGNOSIS — Z11.59 NEED FOR HEPATITIS C SCREENING TEST: ICD-10-CM

## 2021-03-04 RX ORDER — LISINOPRIL 10 MG/1
10 TABLET ORAL DAILY
Qty: 90 TABLET | Refills: 3 | Status: SHIPPED | OUTPATIENT
Start: 2021-03-04

## 2021-03-04 RX ORDER — TAMSULOSIN HYDROCHLORIDE 0.4 MG/1
0.4 CAPSULE ORAL DAILY
Qty: 30 CAPSULE | Refills: 5 | Status: SHIPPED | OUTPATIENT
Start: 2021-03-04 | End: 2022-03-04

## 2021-03-04 RX ORDER — BUDESONIDE AND FORMOTEROL FUMARATE DIHYDRATE 160; 4.5 UG/1; UG/1
2 AEROSOL RESPIRATORY (INHALATION) EVERY 12 HOURS
Qty: 6 G | Refills: 3 | Status: SHIPPED | OUTPATIENT
Start: 2021-03-04 | End: 2022-03-04

## 2021-03-04 RX ORDER — IBUPROFEN 800 MG/1
800 TABLET ORAL EVERY 8 HOURS PRN
Qty: 30 TABLET | Refills: 2 | Status: SHIPPED | OUTPATIENT
Start: 2021-03-04

## 2021-03-04 RX ORDER — TIZANIDINE HYDROCHLORIDE 6 MG/1
6 CAPSULE, GELATIN COATED ORAL 3 TIMES DAILY
Qty: 30 CAPSULE | Refills: 3 | Status: SHIPPED | OUTPATIENT
Start: 2021-03-04 | End: 2021-03-14

## 2021-03-30 ENCOUNTER — PATIENT OUTREACH (OUTPATIENT)
Dept: ADMINISTRATIVE | Facility: HOSPITAL | Age: 61
End: 2021-03-30

## 2021-06-17 ENCOUNTER — TELEPHONE (OUTPATIENT)
Dept: FAMILY MEDICINE | Facility: CLINIC | Age: 61
End: 2021-06-17

## 2021-08-18 DIAGNOSIS — I10 ESSENTIAL HYPERTENSION: ICD-10-CM

## 2021-11-02 NOTE — PROGRESS NOTES
Population Health Outreach.  HTN follow up attempt. No answer, no VM, letter sent at this time.    
Statement Selected

## 2022-01-05 DIAGNOSIS — Z12.11 COLON CANCER SCREENING: ICD-10-CM

## 2022-05-11 DIAGNOSIS — Z11.59 NEED FOR HEPATITIS C SCREENING TEST: ICD-10-CM

## 2024-01-31 ENCOUNTER — PATIENT OUTREACH (OUTPATIENT)
Dept: ADMINISTRATIVE | Facility: HOSPITAL | Age: 64
End: 2024-01-31
Payer: OTHER GOVERNMENT

## 2024-01-31 NOTE — PROGRESS NOTES
Population Health Chart Review & Patient Outreach Details    Outreach Performed: NO    Additional Pop Health Notes:           Updates Requested / Reviewed:               Health Maintenance Topics Overdue:    Health Maintenance Due   Topic Date Due    Hepatitis C Screening  Never done    COVID-19 Vaccine (1) Never done    HIV Screening  Never done    Hemoglobin A1c (Diabetic Prevention Screening)  Never done    Shingles Vaccine (1 of 2) Never done    RSV Vaccine (Age 60+ and Pregnant patients) (1 - 1-dose 60+ series) Never done    Lipid Panel  12/04/2020    Colorectal Cancer Screening  12/13/2020    Influenza Vaccine (1) Never done         Health Maintenance Topic(s) Outreach Outcomes & Actions Taken:    Provider Pt Reattribution - Outreach Outcomes & Actions Taken  : Patient Already Has or Will Establish with External Provider, Care Team Updated if Applicable